# Patient Record
Sex: FEMALE | Race: WHITE | URBAN - METROPOLITAN AREA
[De-identification: names, ages, dates, MRNs, and addresses within clinical notes are randomized per-mention and may not be internally consistent; named-entity substitution may affect disease eponyms.]

---

## 2019-11-06 ENCOUNTER — ESTABLISHED COMPREHENSIVE EXAM (OUTPATIENT)
Dept: URBAN - METROPOLITAN AREA CLINIC 79 | Facility: CLINIC | Age: 64
End: 2019-11-06

## 2019-11-06 DIAGNOSIS — H04.123: ICD-10-CM

## 2019-11-06 DIAGNOSIS — Z96.1: ICD-10-CM

## 2019-11-06 DIAGNOSIS — H18.423: ICD-10-CM

## 2019-11-06 DIAGNOSIS — H26.493: ICD-10-CM

## 2019-11-06 PROCEDURE — 92014 COMPRE OPH EXAM EST PT 1/>: CPT | Mod: 57

## 2019-11-06 PROCEDURE — 92134 CPTRZ OPH DX IMG PST SGM RTA: CPT

## 2019-11-06 PROCEDURE — 66821 AFTER CATARACT LASER SURGERY: CPT

## 2019-11-06 ASSESSMENT — TONOMETRY
OS_IOP_MMHG: 10
OD_IOP_MMHG: 12

## 2019-11-06 ASSESSMENT — VISUAL ACUITY
OD_CC: 20/25+2
OS_CC: 20/25

## 2019-11-27 ENCOUNTER — POST-OP CHECK (OUTPATIENT)
Dept: URBAN - METROPOLITAN AREA CLINIC 79 | Facility: CLINIC | Age: 64
End: 2019-11-27

## 2019-11-27 DIAGNOSIS — H04.123: ICD-10-CM

## 2019-11-27 DIAGNOSIS — H18.423: ICD-10-CM

## 2019-11-27 DIAGNOSIS — H52.13: ICD-10-CM

## 2019-11-27 DIAGNOSIS — Z96.1: ICD-10-CM

## 2019-11-27 PROCEDURE — 99024 POSTOP FOLLOW-UP VISIT: CPT

## 2019-11-27 ASSESSMENT — TONOMETRY
OD_IOP_MMHG: 14
OS_IOP_MMHG: 14

## 2019-11-27 ASSESSMENT — VISUAL ACUITY
OS_SC: J2
OD_SC: 20/400
OS_CC: 20/20-2
OD_CC: 20/20

## 2020-03-10 ENCOUNTER — PROBLEM (OUTPATIENT)
Dept: URBAN - METROPOLITAN AREA CLINIC 79 | Facility: CLINIC | Age: 65
End: 2020-03-10

## 2020-03-10 DIAGNOSIS — H16.223: ICD-10-CM

## 2020-03-10 DIAGNOSIS — H04.123: ICD-10-CM

## 2020-03-10 DIAGNOSIS — H02.88A: ICD-10-CM

## 2020-03-10 DIAGNOSIS — H02.88B: ICD-10-CM

## 2020-03-10 PROCEDURE — MISCLIPIVIEW MISC LIPIVIEW

## 2020-03-10 PROCEDURE — 83516 IMMUNOASSAY NONANTIBODY: CPT

## 2020-03-10 PROCEDURE — 83861 MICROFLUID ANALY TEARS: CPT

## 2020-03-10 PROCEDURE — 92012 INTRM OPH EXAM EST PATIENT: CPT

## 2020-03-10 ASSESSMENT — VISUAL ACUITY
OS_CC: 20/25
OD_CC: 20/20

## 2020-09-17 LAB — HBA1C MFR BLD HPLC: 5.8 %

## 2020-11-03 ENCOUNTER — ESTABLISHED COMPREHENSIVE EXAM (OUTPATIENT)
Dept: URBAN - METROPOLITAN AREA CLINIC 79 | Facility: CLINIC | Age: 65
End: 2020-11-03

## 2020-11-03 VITALS — HEIGHT: 55 IN

## 2020-11-03 DIAGNOSIS — H52.13: ICD-10-CM

## 2020-11-03 DIAGNOSIS — Z96.1: ICD-10-CM

## 2020-11-03 DIAGNOSIS — H26.492: ICD-10-CM

## 2020-11-03 PROCEDURE — 92134 CPTRZ OPH DX IMG PST SGM RTA: CPT | Mod: NC

## 2020-11-03 PROCEDURE — 92014 COMPRE OPH EXAM EST PT 1/>: CPT

## 2020-11-03 PROCEDURE — 92015 DETERMINE REFRACTIVE STATE: CPT | Mod: NC

## 2020-11-03 ASSESSMENT — TONOMETRY
OD_IOP_MMHG: 12
OS_IOP_MMHG: 12

## 2020-11-03 ASSESSMENT — VISUAL ACUITY
OD_CC: 20/20-1
OS_CC: 20/40
OS_SC: 20/50
OD_CC: J1
OS_CC: J10
OD_SC: 20/25

## 2020-11-12 ENCOUNTER — PROCEDURE ONLY (OUTPATIENT)
Dept: URBAN - METROPOLITAN AREA CLINIC 79 | Facility: CLINIC | Age: 65
End: 2020-11-12

## 2020-11-12 VITALS — HEIGHT: 55 IN

## 2020-11-12 DIAGNOSIS — H26.492: ICD-10-CM

## 2020-11-12 PROCEDURE — 66821 AFTER CATARACT LASER SURGERY: CPT

## 2020-11-12 ASSESSMENT — TONOMETRY
OS_IOP_MMHG: 12
OD_IOP_MMHG: 11

## 2020-11-12 ASSESSMENT — VISUAL ACUITY
OD_CC: 20/20-1
OS_PH: 20/25
OS_CC: 20/60

## 2020-12-10 ENCOUNTER — POST-OP CHECK (OUTPATIENT)
Dept: URBAN - METROPOLITAN AREA CLINIC 79 | Facility: CLINIC | Age: 65
End: 2020-12-10

## 2020-12-10 VITALS — HEIGHT: 55 IN

## 2020-12-10 DIAGNOSIS — H26.492: ICD-10-CM

## 2020-12-10 DIAGNOSIS — Z96.1: ICD-10-CM

## 2020-12-10 PROCEDURE — 92015 DETERMINE REFRACTIVE STATE: CPT | Mod: NC

## 2020-12-10 PROCEDURE — 99024 POSTOP FOLLOW-UP VISIT: CPT

## 2020-12-10 ASSESSMENT — VISUAL ACUITY
OD_SC: 20/25
OS_SC: 20/80
OS_CC: 20/25
OD_CC: 20/20
OD_CC: J5
OS_CC: J2

## 2020-12-10 ASSESSMENT — TONOMETRY
OD_IOP_MMHG: 10
OS_IOP_MMHG: 10

## 2021-01-21 ENCOUNTER — OFFICE VISIT (OUTPATIENT)
Dept: GASTROENTEROLOGY | Facility: CLINIC | Age: 66
End: 2021-01-21
Payer: COMMERCIAL

## 2021-01-21 ENCOUNTER — TELEPHONE (OUTPATIENT)
Dept: GASTROENTEROLOGY | Facility: CLINIC | Age: 66
End: 2021-01-21

## 2021-01-21 VITALS
SYSTOLIC BLOOD PRESSURE: 126 MMHG | DIASTOLIC BLOOD PRESSURE: 80 MMHG | HEIGHT: 67 IN | WEIGHT: 204.2 LBS | BODY MASS INDEX: 32.05 KG/M2 | HEART RATE: 67 BPM

## 2021-01-21 DIAGNOSIS — R13.19 ESOPHAGEAL DYSPHAGIA: ICD-10-CM

## 2021-01-21 DIAGNOSIS — R10.32 LEFT LOWER QUADRANT ABDOMINAL PAIN: Primary | ICD-10-CM

## 2021-01-21 DIAGNOSIS — R19.4 CHANGE IN BOWEL HABITS: ICD-10-CM

## 2021-01-21 PROCEDURE — 99214 OFFICE O/P EST MOD 30 MIN: CPT | Performed by: INTERNAL MEDICINE

## 2021-01-21 RX ORDER — FLUOXETINE HYDROCHLORIDE 20 MG/1
20 CAPSULE ORAL DAILY
COMMUNITY

## 2021-01-21 RX ORDER — FLUOXETINE 10 MG/1
10 TABLET, FILM COATED ORAL DAILY
COMMUNITY

## 2021-01-21 RX ORDER — OMEPRAZOLE 20 MG/1
20 CAPSULE, DELAYED RELEASE ORAL DAILY
COMMUNITY

## 2021-01-21 NOTE — PROGRESS NOTES
[facility] - Outpatient Consultation  Stefanie Nguyen 76 y o  female MRN: [id number]  Encounter: [id number]    ASSESSMENT AND PLAN:      1  Left lower quadrant abdominal pain  2  Change in bowel habits  2-3 months of left lower quadrant discomfort and looser more frequent stools  No etiology on ultrasound  Differential includes IBS-D or microscopic colitis  Inflammatory bowel disease less likely  She is agreeable to colonoscopy with biopsies for microscopic colitis  If unremarkable will'treat for IBS D, initially with fiber  3  GERD  4  Esophageal dysphagia  Fair symptom control with Prilosec, immediate flare of symptoms if she misses a dose  Feels a lump in her throat with swallowing but no specific triggers  Plan upper endoscopy to assess for reflux esophagitis and eosinophilic esophagitis, as well as exacerbating factors like hiatal hernia  Followup Appointment:  Pending EGD  ______________________________________________________________________    Chief Complaint   Patient presents with    LLQ pain     feels like something is stuck in her throat  HPI:   Stefanie Nguyen is a 76y o  year old female who presents for consultation at the request for PCP for lower abdominal pain, change in bowel habits and swallowing issues  She was last seen in the office in October of 2015 for reflux  She continues to take Prilosec 20 mg daily with fair control of reflux  She has immediate recurrence of symptoms if she misses a single dose  There are no specific trigger foods though she generally avoids spicy foods  She feels in her lump in her throat most days  There is no ryan dysphagia  She has no difficulty with liquids or pills  She complains of about 3 months of left lower quadrant abdominal pain that is present every day but varies in intensity throughout the day  His typically more significant before stools  She has 4 or 5 loose stools daily    She typically has 3 of them each morning and additional stools after meals  She denies any nocturnal complaints  Stools are barely formed  She denies any blood or mucus  She denies any recent antibiotics  Historical Information   Past Medical History:   Diagnosis Date    GERD (gastroesophageal reflux disease)      Past Surgical History:   Procedure Laterality Date    APPENDECTOMY      CATARACT EXTRACTION      COLONOSCOPY  10/2015    UPPER GASTROINTESTINAL ENDOSCOPY  10/2015    normal     Social History     Substance and Sexual Activity   Alcohol Use Yes    Frequency: Monthly or less     Social History     Substance and Sexual Activity   Drug Use Not on file     Social History     Tobacco Use   Smoking Status Never Smoker   Smokeless Tobacco Never Used     Family History   Problem Relation Age of Onset    Breast cancer additional onset Mother     Heart disease Mother     Diabetes Mother     Hypertension Father     Diabetes Father     Heart disease Brother     Colon polyps Neg Hx     Colon cancer Neg Hx        Meds/Allergies     Current Outpatient Medications:     FLUoxetine (PROzac) 10 MG tablet    FLUoxetine (PROzac) 20 mg capsule    omeprazole (PriLOSEC) 20 mg delayed release capsule    No Known Allergies    PHYSICAL EXAM:    Blood pressure 126/80, pulse 67, height 5' 7" (1 702 m), weight 92 6 kg (204 lb 3 2 oz)  Body mass index is 31 98 kg/m²  General Appearance: NAD, cooperative, alert  Eyes: Anicteric, PERRLA, EOMI  ENT:  Normocephalic, atraumatic, normal mucosa  Neck:  Supple, symmetrical, trachea midline,   Resp:  Clear to auscultation bilaterally; no rales, rhonchi or wheezing; respirations unlabored   CV:  S1 S2, Regular rate and rhythm; no murmur, rub, or gallop  GI:  Soft, non-tender, non-distended; normal bowel sounds; no masses, no organomegaly   Rectal: Deferred  Musculoskeletal: No cyanosis, clubbing or edema  Normal ROM    Skin:  No jaundice, rashes, or lesions   Heme/Lymph: No palpable cervical lymphadenopathy  Psych: Normal affect, good eye contact  Neuro: No gross deficits, AAOx3    Lab Results:   No results found for: WBC, HGB, HCT, MCV, PLT  No results found for: NA, K, CL, CO2, ANIONGAP, BUN, CREATININE, GLUCOSE, GLUF, CALCIUM, CORRECTEDCA, AST, ALT, ALKPHOS, PROT, BILITOT, EGFR  No results found for: IRON, TIBC, FERRITIN  No results found for: LIPASE    Radiology Results:   No results found  REVIEW OF SYSTEMS:    CONSTITUTIONAL: Denies any fever, chills, rigors, and weight loss  HEENT: No earache or tinnitus  Denies hearing loss or visual disturbances  CARDIOVASCULAR: No chest pain or palpitations  RESPIRATORY: Denies any cough, hemoptysis, shortness of breath or dyspnea on exertion  GASTROINTESTINAL: As noted in the History of Present Illness  GENITOURINARY: No problems with urination  Denies any hematuria or dysuria  NEUROLOGIC: No dizziness or vertigo, denies headaches  MUSCULOSKELETAL: Denies any muscle or joint pain  SKIN: Denies skin rashes or itching  ENDOCRINE: Denies excessive thirst  Denies intolerance to heat or cold  PSYCHOSOCIAL: Denies depression or anxiety  Denies any recent memory loss

## 2021-01-21 NOTE — TELEPHONE ENCOUNTER
Why does your doctor want you to have this procedure? Dysphagia; LLQ pain    Do you have kidney disease?  no  If yes, are you on dialysis :     Have you had diverticulitis within the past 2 months? no    Are you diabetic?  no  If yes, insulin dependent: If yes, provide diabetic instructions sheet     Do take iron supplements?  no  If yes, instruct patient to hold iron supplement for 7 days prior    Are you on a blood thinner? no   Was the blood thinner sheet complete and faxed to cardiologist no  Plavix (clopidogrel), Coumadin (warfarin), Lovenox (enoxaparin), Xarelto (rivaroxaban), Pradaxa(dabigatran), Eliquis(apixaban) Savaysa/Lixiana (edoxapan)    Do you have an automatic implantable cardiac defibrillator (AICD)/pacemaker ([facility])? no  Was AICD/pacemaker sheet completed and faxed to cardiologist? no    Are you on home oxygen? no  If yes, continuous or nocturnal:     Have you been treated for MRSA, VRE or any communicable diseases? no    Heart attack, stroke, or stent within 3 months? no  Schedule at Hospital if within 3-6 months   Use nitroglycerin for chest pain in the last 6 months? no    History of organ  transplant?  no   If yes, notify Endo      History of neck/throat/tongue surgery or cancer? no  IF yes, notify Endo      Any problems with anesthesia in the past? no     Was stool C diff ordered?  no Stool specimen needs to be completed prior to procedure    Do have any facial or body piercings?no     Do you have a latex allergy? no     Do have an allergy to metals? (Bravo study only) no     If pediatric patient, was consent faxed to pediatrician no     Patient rights reviewed yes    Combo phone prep completed in office; sutab instructions reviewed and provided to pt along with sutab sample   Rx forwarded to provider for approval

## 2021-01-21 NOTE — H&P (VIEW-ONLY)
[facility] - Outpatient Consultation  Jeremi Chu 76 y o  female MRN: [id number]  Encounter: [id number]    ASSESSMENT AND PLAN:      1  Left lower quadrant abdominal pain  2  Change in bowel habits  2-3 months of left lower quadrant discomfort and looser more frequent stools  No etiology on ultrasound  Differential includes IBS-D or microscopic colitis  Inflammatory bowel disease less likely  She is agreeable to colonoscopy with biopsies for microscopic colitis  If unremarkable will'treat for IBS D, initially with fiber  3  GERD  4  Esophageal dysphagia  Fair symptom control with Prilosec, immediate flare of symptoms if she misses a dose  Feels a lump in her throat with swallowing but no specific triggers  Plan upper endoscopy to assess for reflux esophagitis and eosinophilic esophagitis, as well as exacerbating factors like hiatal hernia  Followup Appointment:  Pending EGD  ______________________________________________________________________    Chief Complaint   Patient presents with    LLQ pain     feels like something is stuck in her throat  HPI:   Jeremi Chu is a 76y o  year old female who presents for consultation at the request for PCP for lower abdominal pain, change in bowel habits and swallowing issues  She was last seen in the office in October of 2015 for reflux  She continues to take Prilosec 20 mg daily with fair control of reflux  She has immediate recurrence of symptoms if she misses a single dose  There are no specific trigger foods though she generally avoids spicy foods  She feels in her lump in her throat most days  There is no ryan dysphagia  She has no difficulty with liquids or pills  She complains of about 3 months of left lower quadrant abdominal pain that is present every day but varies in intensity throughout the day  His typically more significant before stools  She has 4 or 5 loose stools daily    She typically has 3 of them each morning and additional stools after meals  She denies any nocturnal complaints  Stools are barely formed  She denies any blood or mucus  She denies any recent antibiotics  Historical Information   Past Medical History:   Diagnosis Date    GERD (gastroesophageal reflux disease)      Past Surgical History:   Procedure Laterality Date    APPENDECTOMY      CATARACT EXTRACTION      COLONOSCOPY  10/2015    UPPER GASTROINTESTINAL ENDOSCOPY  10/2015    normal     Social History     Substance and Sexual Activity   Alcohol Use Yes    Frequency: Monthly or less     Social History     Substance and Sexual Activity   Drug Use Not on file     Social History     Tobacco Use   Smoking Status Never Smoker   Smokeless Tobacco Never Used     Family History   Problem Relation Age of Onset    Breast cancer additional onset Mother     Heart disease Mother     Diabetes Mother     Hypertension Father     Diabetes Father     Heart disease Brother     Colon polyps Neg Hx     Colon cancer Neg Hx        Meds/Allergies     Current Outpatient Medications:     FLUoxetine (PROzac) 10 MG tablet    FLUoxetine (PROzac) 20 mg capsule    omeprazole (PriLOSEC) 20 mg delayed release capsule    No Known Allergies    PHYSICAL EXAM:    Blood pressure 126/80, pulse 67, height 5' 7" (1 702 m), weight 92 6 kg (204 lb 3 2 oz)  Body mass index is 31 98 kg/m²  General Appearance: NAD, cooperative, alert  Eyes: Anicteric, PERRLA, EOMI  ENT:  Normocephalic, atraumatic, normal mucosa  Neck:  Supple, symmetrical, trachea midline,   Resp:  Clear to auscultation bilaterally; no rales, rhonchi or wheezing; respirations unlabored   CV:  S1 S2, Regular rate and rhythm; no murmur, rub, or gallop  GI:  Soft, non-tender, non-distended; normal bowel sounds; no masses, no organomegaly   Rectal: Deferred  Musculoskeletal: No cyanosis, clubbing or edema  Normal ROM    Skin:  No jaundice, rashes, or lesions   Heme/Lymph: No palpable cervical lymphadenopathy  Psych: Normal affect, good eye contact  Neuro: No gross deficits, AAOx3    Lab Results:   No results found for: WBC, HGB, HCT, MCV, PLT  No results found for: NA, K, CL, CO2, ANIONGAP, BUN, CREATININE, GLUCOSE, GLUF, CALCIUM, CORRECTEDCA, AST, ALT, ALKPHOS, PROT, BILITOT, EGFR  No results found for: IRON, TIBC, FERRITIN  No results found for: LIPASE    Radiology Results:   No results found  REVIEW OF SYSTEMS:    CONSTITUTIONAL: Denies any fever, chills, rigors, and weight loss  HEENT: No earache or tinnitus  Denies hearing loss or visual disturbances  CARDIOVASCULAR: No chest pain or palpitations  RESPIRATORY: Denies any cough, hemoptysis, shortness of breath or dyspnea on exertion  GASTROINTESTINAL: As noted in the History of Present Illness  GENITOURINARY: No problems with urination  Denies any hematuria or dysuria  NEUROLOGIC: No dizziness or vertigo, denies headaches  MUSCULOSKELETAL: Denies any muscle or joint pain  SKIN: Denies skin rashes or itching  ENDOCRINE: Denies excessive thirst  Denies intolerance to heat or cold  PSYCHOSOCIAL: Denies depression or anxiety  Denies any recent memory loss

## 2021-01-28 ENCOUNTER — ANESTHESIA (OUTPATIENT)
Dept: GASTROENTEROLOGY | Facility: AMBULATORY SURGERY CENTER | Age: 66
End: 2021-01-28

## 2021-01-28 ENCOUNTER — HOSPITAL ENCOUNTER (OUTPATIENT)
Dept: GASTROENTEROLOGY | Facility: AMBULATORY SURGERY CENTER | Age: 66
Discharge: HOME | End: 2021-01-28
Payer: COMMERCIAL

## 2021-01-28 ENCOUNTER — ANESTHESIA EVENT (OUTPATIENT)
Dept: GASTROENTEROLOGY | Facility: AMBULATORY SURGERY CENTER | Age: 66
End: 2021-01-28

## 2021-01-28 VITALS
RESPIRATION RATE: 18 BRPM | SYSTOLIC BLOOD PRESSURE: 141 MMHG | DIASTOLIC BLOOD PRESSURE: 79 MMHG | OXYGEN SATURATION: 98 % | TEMPERATURE: 98 F | HEART RATE: 83 BPM

## 2021-01-28 VITALS — HEART RATE: 79 BPM

## 2021-01-28 DIAGNOSIS — R19.4 CHANGE IN BOWEL HABITS: ICD-10-CM

## 2021-01-28 DIAGNOSIS — R13.19 ESOPHAGEAL DYSPHAGIA: ICD-10-CM

## 2021-01-28 DIAGNOSIS — R10.32 LEFT LOWER QUADRANT ABDOMINAL PAIN: ICD-10-CM

## 2021-01-28 PROCEDURE — 88341 IMHCHEM/IMCYTCHM EA ADD ANTB: CPT | Performed by: PATHOLOGY

## 2021-01-28 PROCEDURE — 43239 EGD BIOPSY SINGLE/MULTIPLE: CPT | Performed by: INTERNAL MEDICINE

## 2021-01-28 PROCEDURE — 43450 DILATE ESOPHAGUS 1/MULT PASS: CPT | Performed by: INTERNAL MEDICINE

## 2021-01-28 PROCEDURE — 45380 COLONOSCOPY AND BIOPSY: CPT | Performed by: INTERNAL MEDICINE

## 2021-01-28 PROCEDURE — 88305 TISSUE EXAM BY PATHOLOGIST: CPT | Performed by: PATHOLOGY

## 2021-01-28 PROCEDURE — 88342 IMHCHEM/IMCYTCHM 1ST ANTB: CPT | Performed by: PATHOLOGY

## 2021-01-28 RX ORDER — SODIUM CHLORIDE 9 MG/ML
50 INJECTION, SOLUTION INTRAVENOUS CONTINUOUS
Status: DISCONTINUED | OUTPATIENT
Start: 2021-01-28 | End: 2021-02-01 | Stop reason: HOSPADM

## 2021-01-28 RX ORDER — PROPOFOL 10 MG/ML
INJECTION, EMULSION INTRAVENOUS AS NEEDED
Status: DISCONTINUED | OUTPATIENT
Start: 2021-01-28 | End: 2021-01-28

## 2021-01-28 RX ADMIN — PROPOFOL 100 MG: 10 INJECTION, EMULSION INTRAVENOUS at 09:31

## 2021-01-28 RX ADMIN — PROPOFOL 30 MG: 10 INJECTION, EMULSION INTRAVENOUS at 09:46

## 2021-01-28 RX ADMIN — PROPOFOL 50 MG: 10 INJECTION, EMULSION INTRAVENOUS at 09:34

## 2021-01-28 RX ADMIN — PROPOFOL 50 MG: 10 INJECTION, EMULSION INTRAVENOUS at 09:38

## 2021-01-28 RX ADMIN — SODIUM CHLORIDE 50 ML/HR: 9 INJECTION, SOLUTION INTRAVENOUS at 09:18

## 2021-01-28 RX ADMIN — PROPOFOL 30 MG: 10 INJECTION, EMULSION INTRAVENOUS at 09:39

## 2021-01-28 NOTE — ANESTHESIA PREPROCEDURE EVALUATION
Procedure:  COLONOSCOPY  EGD    Relevant Problems   GI/HEPATIC   (+) GERD (gastroesophageal reflux disease)        Physical Exam    Airway    Mallampati score: II  TM Distance: >3 FB  Neck ROM: full     Dental   No notable dental hx     Cardiovascular  Cardiovascular exam normal    Pulmonary  Pulmonary exam normal     Other Findings        Anesthesia Plan  ASA Score- 2     Anesthesia Type- IV sedation with anesthesia with ASA Monitors  Additional Monitors:   Airway Plan:           Plan Factors-    Chart reviewed  Patient is not a current smoker  Induction- intravenous  Postoperative Plan-     Informed Consent- Anesthetic plan and risks discussed with patient

## 2021-01-28 NOTE — INTERVAL H&P NOTE
H&P reviewed  After examining the patient I find no changes in the patients condition since the H&P had been written      Vitals:    01/28/21 0937   BP: 168/76   Pulse: 66   Resp: 18   Temp:    SpO2: 99%

## 2021-01-28 NOTE — ANESTHESIA POSTPROCEDURE EVALUATION
Post-Op Assessment Note    CV Status:  Stable  Pain Score: 0    Pain management: adequate     Mental Status:  Alert and sleepy   Hydration Status:  Euvolemic and stable   PONV Controlled:  None   Airway Patency:  Patent      Post Op Vitals Reviewed: Yes      Staff: Anesthesiologist         No complications documented      BP      Temp      Pulse     Resp      SpO2

## 2021-02-15 ENCOUNTER — TELEPHONE (OUTPATIENT)
Dept: GASTROENTEROLOGY | Facility: CLINIC | Age: 66
End: 2021-02-15

## 2021-02-15 DIAGNOSIS — K52.9 COLITIS: Primary | ICD-10-CM

## 2021-02-15 NOTE — RESULT ENCOUNTER NOTE
Discussed with patient,   Upper GI symptoms well controlled after dilation  Still has issues with loose stools  Biopsies consistent with inflammatory bowel disease  Will obtain C-reactive protein, sed rate and calprotectin and if elevated will start mesalamine

## 2021-02-15 NOTE — TELEPHONE ENCOUNTER
Called patient with biopsy results  Colon biopsies were obtained for microscopic colitis, despite normal mucosa they are more consistent with inflammatory bowel disease  Symptoms are mild and persistent  Will obtain inflammatory markers including calprotectin and if they are elevated will start mesalamine    Labs submitted to [facility]      Please arrange office visit with me in 2-3 months

## 2021-03-23 LAB — HBA1C MFR BLD HPLC: 5.9 %

## 2021-04-03 LAB — EXT SARS-COV-2: DETECTED

## 2021-04-06 LAB
CALPROTECTIN STL-MCNT: 47 UG/G (ref 0–120)
CRP SERPL-MCNC: 1 MG/L (ref 0–10)
ERYTHROCYTE [SEDIMENTATION RATE] IN BLOOD BY WESTERGREN METHOD: 15 MM/HR (ref 0–40)

## 2021-04-13 ENCOUNTER — TELEMEDICINE (OUTPATIENT)
Dept: GASTROENTEROLOGY | Facility: CLINIC | Age: 66
End: 2021-04-13
Payer: COMMERCIAL

## 2021-04-13 VITALS — WEIGHT: 200 LBS | HEIGHT: 67 IN | BODY MASS INDEX: 31.39 KG/M2

## 2021-04-13 DIAGNOSIS — K21.00 GASTROESOPHAGEAL REFLUX DISEASE WITH ESOPHAGITIS WITHOUT HEMORRHAGE: Primary | ICD-10-CM

## 2021-04-13 DIAGNOSIS — R19.4 CHANGE IN BOWEL HABITS: ICD-10-CM

## 2021-04-13 DIAGNOSIS — Z12.11 SCREENING FOR COLON CANCER: ICD-10-CM

## 2021-04-13 PROCEDURE — 99213 OFFICE O/P EST LOW 20 MIN: CPT | Performed by: INTERNAL MEDICINE

## 2021-04-13 RX ORDER — SACCHAROMYCES BOULARDII 250 MG
250 CAPSULE ORAL 2 TIMES DAILY
COMMUNITY

## 2021-04-13 NOTE — PROGRESS NOTES
Virtual Regular Visit      Assessment/Plan:    1  GERD   upper endoscopy in January negative for eosinophilic esophagitis, biopsies from the mid esophagus show mild chronic inflammation  Symptoms generally well controlled with omeprazole 20 mg daily  I recommended Gaviscon as needed for intermittent symptoms    2  Change in bowel habits   complained of several months of left lower quadrant discomfort and looser stools at her initial visit  Colon mucosa normal on colonoscopy but biopsy showed chronic inflammation  Inflammatory markers are normal and her symptoms have improved, doubt inflammatory bowel disease  She will continue her probiotic and contact me if symptoms  Recur    3  Colon cancer screening   negative colonoscopy 2021, 10 year recall       Reason for visit is   Chief Complaint   Patient presents with    Follow-up     scope/labs    Virtual Regular Visit        Encounter provider Franchesca Newby DO    Provider located at [address] [address] [phone]      Recent Visits  No visits were found meeting these conditions  Showing recent visits within past 7 days and meeting all other requirements     Today's Visits  Date Type Provider Dept   04/13/21 Telemedicine Franchesca Newby DO [facility]   Showing today's visits and meeting all other requirements     Future Appointments  No visits were found meeting these conditions  Showing future appointments within next 150 days and meeting all other requirements        The patient was identified by name and date of birth  Kalpana Edilson was informed that this is a telemedicine visit and that the visit is being conducted through [address] [place] [address] and patient was informed that this is not a secure, HIPAA-compliant platform  She agrees to proceed     My office door was closed  No one else was in the room    She acknowledged consent and understanding of privacy and security of the video platform  The patient has agreed to participate and understands they can discontinue the visit at any time  Patient is aware this is a billable service  Subjective  Ernesto Wallace is a 76 y o  female  Who presents for follow-up on reflux and change in bowel habits  She was last seen at the time of a colonoscopy in late January  Her reflux symptoms have improved significantly after EGD with dilatation  She has occasional substernal pressure not directly related to meals  She has tried antacids intermittently but is not sure if symptoms improve  She denies dysphagia, melena or other alarm symptoms  Overall she is happy with upper GI symptoms  Before the colonoscopy she complained of diarrhea and abdominal cramping  Biopsies were negative for microscopic colitis but there was chronic inflammation on biopsies  Inflammatory markers including a calprotectin were negative and she denies any further abdominal pain since starting a probiotic  [place] HPI     Past Medical History:   Diagnosis Date    Depression     GERD (gastroesophageal reflux disease)        Past Surgical History:   Procedure Laterality Date    APPENDECTOMY      CATARACT EXTRACTION      COLONOSCOPY  10/2015    HAND SURGERY      KNEE SURGERY      UPPER GASTROINTESTINAL ENDOSCOPY  10/2015    normal       Current Outpatient Medications   Medication Sig Dispense Refill    FLUoxetine (PROzac) 10 MG tablet Take 10 mg by mouth daily Alternating with 20 mg       FLUoxetine (PROzac) 20 mg capsule Take 20 mg by mouth daily Alternating with 10 mg       omeprazole (PriLOSEC) 20 mg delayed release capsule Take 20 mg by mouth daily      saccharomyces boulardii (FLORASTOR) 250 mg capsule Take 250 mg by mouth 2 (two) times a day       No current facility-administered medications for this visit           No Known Allergies    Review of Systems    All other systems reviewed and are negative  Video Exam    Vitals: 04/13/21 0806   Weight: 90 7 kg (200 lb)   Height: 5' 7" (1 702 m)       Physical Exam   Physical Exam   Constitutional: oriented to person, place, and time  appears well-developed and well-nourished  HENT:   Head: Normocephalic  Eyes: EOM are normal    Neck: Normal range of motion  Pulmonary/Chest: Effort normal    Abdominal: Normal appearance  Musculoskeletal: Normal range of motion  Neurological: alert and oriented to person, place, and time  Psychiatric: normal mood and affect  behavior is normal  Judgment and thought content normal    I spent 9 minutes directly with the patient during this visit      VIRTUAL VISIT DISCLAIMER    Princess Melgar acknowledges that she has consented to an online visit or consultation  She understands that the online visit is based solely on information provided by her, and that, in the absence of a face-to-face physical evaluation by the physician, the diagnosis she receives is both limited and provisional in terms of accuracy and completeness  This is not intended to replace a full medical face-to-face evaluation by the physician  Princess Melgar understands and accepts these terms

## 2021-12-16 ENCOUNTER — ESTABLISHED COMPREHENSIVE EXAM (OUTPATIENT)
Dept: URBAN - METROPOLITAN AREA CLINIC 79 | Facility: CLINIC | Age: 66
End: 2021-12-16

## 2021-12-16 DIAGNOSIS — H04.123: ICD-10-CM

## 2021-12-16 PROCEDURE — 92014 COMPRE OPH EXAM EST PT 1/>: CPT

## 2021-12-16 ASSESSMENT — VISUAL ACUITY
OS_CC: 20/20
OD_CC: J2
OS_SC: 20/70
OS_CC: J2
OD_SC: 20/25+3
OD_CC: 20/20

## 2021-12-16 ASSESSMENT — TONOMETRY
OD_IOP_MMHG: 12
OS_IOP_MMHG: 12

## 2022-01-04 ENCOUNTER — PROBLEM (OUTPATIENT)
Dept: URBAN - METROPOLITAN AREA CLINIC 79 | Facility: CLINIC | Age: 67
End: 2022-01-04

## 2022-01-04 DIAGNOSIS — H43.812: ICD-10-CM

## 2022-01-04 PROCEDURE — 99214 OFFICE O/P EST MOD 30 MIN: CPT

## 2022-01-04 PROCEDURE — 92134 CPTRZ OPH DX IMG PST SGM RTA: CPT | Mod: NC

## 2022-01-04 PROCEDURE — 92250 FUNDUS PHOTOGRAPHY W/I&R: CPT

## 2022-01-04 ASSESSMENT — VISUAL ACUITY
OS_CC: 20/20
OD_CC: 20/20

## 2022-01-04 ASSESSMENT — TONOMETRY: OS_IOP_MMHG: 13

## 2022-02-16 ENCOUNTER — FOLLOW UP (OUTPATIENT)
Dept: URBAN - METROPOLITAN AREA CLINIC 79 | Facility: CLINIC | Age: 67
End: 2022-02-16

## 2022-02-16 DIAGNOSIS — H31.091: ICD-10-CM

## 2022-02-16 DIAGNOSIS — H43.812: ICD-10-CM

## 2022-02-16 PROCEDURE — 92250 FUNDUS PHOTOGRAPHY W/I&R: CPT

## 2022-02-16 PROCEDURE — 92014 COMPRE OPH EXAM EST PT 1/>: CPT

## 2022-02-16 ASSESSMENT — TONOMETRY
OS_IOP_MMHG: 10
OD_IOP_MMHG: 10

## 2022-02-16 ASSESSMENT — VISUAL ACUITY
OS_CC: 20/20-1
OD_CC: 20/20

## 2023-02-24 ENCOUNTER — ESTABLISHED COMPREHENSIVE EXAM (OUTPATIENT)
Dept: URBAN - METROPOLITAN AREA CLINIC 79 | Facility: CLINIC | Age: 68
End: 2023-02-24

## 2023-02-24 DIAGNOSIS — Z96.1: ICD-10-CM

## 2023-02-24 DIAGNOSIS — H52.13: ICD-10-CM

## 2023-02-24 DIAGNOSIS — H04.123: ICD-10-CM

## 2023-02-24 PROCEDURE — 92014 COMPRE OPH EXAM EST PT 1/>: CPT

## 2023-02-24 PROCEDURE — 92015 DETERMINE REFRACTIVE STATE: CPT

## 2023-02-24 ASSESSMENT — VISUAL ACUITY
OD_SC: 20/20-1
OS_SC: 20/70
OS_CC: 20/20
OS_CC: 20/25
OD_CC: 20/25
OD_CC: 20/20-1

## 2023-02-24 ASSESSMENT — TONOMETRY
OS_IOP_MMHG: 13
OD_IOP_MMHG: 12

## 2024-01-25 ENCOUNTER — PROBLEM (OUTPATIENT)
Dept: URBAN - METROPOLITAN AREA CLINIC 79 | Facility: CLINIC | Age: 69
End: 2024-01-25

## 2024-01-25 DIAGNOSIS — H31.091: ICD-10-CM

## 2024-01-25 DIAGNOSIS — H43.813: ICD-10-CM

## 2024-01-25 PROCEDURE — 92250 FUNDUS PHOTOGRAPHY W/I&R: CPT

## 2024-01-25 PROCEDURE — 99214 OFFICE O/P EST MOD 30 MIN: CPT

## 2024-01-25 ASSESSMENT — TONOMETRY
OD_IOP_MMHG: 13
OS_IOP_MMHG: 11

## 2024-01-25 ASSESSMENT — VISUAL ACUITY
OS_CC: 20/20
OD_CC: 20/20-1

## 2024-02-29 ENCOUNTER — FOLLOW UP (OUTPATIENT)
Dept: URBAN - METROPOLITAN AREA CLINIC 79 | Facility: CLINIC | Age: 69
End: 2024-02-29

## 2024-02-29 DIAGNOSIS — H43.813: ICD-10-CM

## 2024-02-29 PROCEDURE — 99213 OFFICE O/P EST LOW 20 MIN: CPT

## 2024-02-29 ASSESSMENT — VISUAL ACUITY
OD_CC: 20/20
OS_CC: 20/30

## 2024-02-29 ASSESSMENT — TONOMETRY
OS_IOP_MMHG: 12
OD_IOP_MMHG: 11

## 2024-04-22 ENCOUNTER — HOSPITAL ENCOUNTER (OUTPATIENT)
Dept: HOSPITAL 99 - WDC | Age: 69
End: 2024-04-22
Payer: COMMERCIAL

## 2024-04-22 DIAGNOSIS — M85.80: Primary | ICD-10-CM

## 2024-04-22 DIAGNOSIS — Z12.31: ICD-10-CM

## 2024-04-23 ENCOUNTER — HOSPITAL ENCOUNTER (OUTPATIENT)
Dept: HOSPITAL 99 - RAD | Age: 69
End: 2024-04-23
Payer: COMMERCIAL

## 2024-04-23 DIAGNOSIS — M79.641: Primary | ICD-10-CM

## 2024-06-06 ENCOUNTER — ESTABLISHED COMPREHENSIVE EXAM (OUTPATIENT)
Dept: URBAN - METROPOLITAN AREA CLINIC 79 | Facility: CLINIC | Age: 69
End: 2024-06-06

## 2024-06-06 DIAGNOSIS — H52.13: ICD-10-CM

## 2024-06-06 DIAGNOSIS — Z96.1: ICD-10-CM

## 2024-06-06 DIAGNOSIS — H31.091: ICD-10-CM

## 2024-06-06 DIAGNOSIS — H04.123: ICD-10-CM

## 2024-06-06 DIAGNOSIS — H43.813: ICD-10-CM

## 2024-06-06 DIAGNOSIS — H52.4: ICD-10-CM

## 2024-06-06 DIAGNOSIS — H18.423: ICD-10-CM

## 2024-06-06 PROCEDURE — 92250 FUNDUS PHOTOGRAPHY W/I&R: CPT

## 2024-06-06 PROCEDURE — 99213 OFFICE O/P EST LOW 20 MIN: CPT

## 2024-06-06 PROCEDURE — 92015 DETERMINE REFRACTIVE STATE: CPT

## 2024-06-06 ASSESSMENT — VISUAL ACUITY
OS_CC: 20/25
OS_CC: 20/30
OS_PH: 20/20
OD_CC: 20/25
OD_CC: 20/20-2

## 2024-06-06 ASSESSMENT — TONOMETRY
OS_IOP_MMHG: 09
OD_IOP_MMHG: 09

## 2024-07-02 ENCOUNTER — HOSPITAL ENCOUNTER (OUTPATIENT)
Dept: HOSPITAL 99 - RAD | Age: 69
End: 2024-07-02
Payer: COMMERCIAL

## 2024-07-02 DIAGNOSIS — N18.31: Primary | ICD-10-CM

## 2024-07-30 ENCOUNTER — HOSPITAL ENCOUNTER (OUTPATIENT)
Dept: HOSPITAL 99 - RPT | Age: 69
Discharge: HOME | End: 2024-07-30
Payer: COMMERCIAL

## 2024-07-30 DIAGNOSIS — M19.011: Primary | ICD-10-CM

## 2024-07-30 DIAGNOSIS — Z73.6: ICD-10-CM

## 2024-08-22 ENCOUNTER — HOSPITAL ENCOUNTER (OUTPATIENT)
Dept: HOSPITAL 99 - RPT | Age: 69
Discharge: HOME | End: 2024-08-22
Payer: COMMERCIAL

## 2024-08-22 DIAGNOSIS — M19.011: Primary | ICD-10-CM

## 2024-08-22 DIAGNOSIS — Z73.6: ICD-10-CM

## 2025-07-25 ENCOUNTER — HOSPITAL ENCOUNTER (OUTPATIENT)
Dept: HOSPITAL 99 - WDC | Age: 70
End: 2025-07-25
Payer: COMMERCIAL

## 2025-07-25 DIAGNOSIS — Z12.31: Primary | ICD-10-CM

## 2025-09-03 ENCOUNTER — ESTABLISHED COMPREHENSIVE EXAM (OUTPATIENT)
Dept: URBAN - METROPOLITAN AREA CLINIC 79 | Facility: CLINIC | Age: 70
End: 2025-09-03

## 2025-09-03 DIAGNOSIS — H31.091: ICD-10-CM

## 2025-09-03 DIAGNOSIS — H52.4: ICD-10-CM

## 2025-09-03 DIAGNOSIS — H04.123: ICD-10-CM

## 2025-09-03 DIAGNOSIS — H43.813: ICD-10-CM

## 2025-09-03 DIAGNOSIS — H18.423: ICD-10-CM

## 2025-09-03 PROCEDURE — 92015 DETERMINE REFRACTIVE STATE: CPT

## 2025-09-03 PROCEDURE — 92134 CPTRZ OPH DX IMG PST SGM RTA: CPT | Mod: NC

## 2025-09-03 PROCEDURE — 92014 COMPRE OPH EXAM EST PT 1/>: CPT

## 2025-09-03 ASSESSMENT — TONOMETRY
OS_IOP_MMHG: 10
OD_IOP_MMHG: 11

## 2025-09-03 ASSESSMENT — VISUAL ACUITY
OS_CC: 20/25-1
OD_SC: 20/70
OD_CC: 20/40
OS_CC: 20/100
OD_CC: 20/30
OS_SC: 20/200
OD_PH: 20/30+1

## (undated) RX ORDER — BROMFENAC SODIUM 0.7 MG/ML
1 SOLUTION/ DROPS OPHTHALMIC ONCE A DAY
Start: 2020-11-12

## (undated) RX ORDER — BROMFENAC SODIUM 0.7 MG/ML
1 SOLUTION/ DROPS OPHTHALMIC ONCE A DAY
Start: 2019-11-06